# Patient Record
Sex: FEMALE | URBAN - METROPOLITAN AREA
[De-identification: names, ages, dates, MRNs, and addresses within clinical notes are randomized per-mention and may not be internally consistent; named-entity substitution may affect disease eponyms.]

---

## 2022-04-30 ENCOUNTER — TELEPHONE ENCOUNTER (OUTPATIENT)
Dept: URBAN - METROPOLITAN AREA CLINIC 121 | Facility: CLINIC | Age: 57
End: 2022-04-30

## 2022-05-01 ENCOUNTER — TELEPHONE ENCOUNTER (OUTPATIENT)
Dept: URBAN - METROPOLITAN AREA CLINIC 121 | Facility: CLINIC | Age: 57
End: 2022-05-01

## 2022-05-02 ENCOUNTER — WEB ENCOUNTER (OUTPATIENT)
Dept: URBAN - METROPOLITAN AREA CLINIC 27 | Facility: CLINIC | Age: 57
End: 2022-05-02

## 2022-05-02 ENCOUNTER — OFFICE VISIT (OUTPATIENT)
Dept: URBAN - METROPOLITAN AREA CLINIC 27 | Facility: CLINIC | Age: 57
End: 2022-05-02
Payer: COMMERCIAL

## 2022-05-02 ENCOUNTER — DASHBOARD ENCOUNTERS (OUTPATIENT)
Age: 57
End: 2022-05-02

## 2022-05-02 ENCOUNTER — LAB OUTSIDE AN ENCOUNTER (OUTPATIENT)
Dept: URBAN - METROPOLITAN AREA CLINIC 27 | Facility: CLINIC | Age: 57
End: 2022-05-02

## 2022-05-02 VITALS
DIASTOLIC BLOOD PRESSURE: 99 MMHG | BODY MASS INDEX: 24.75 KG/M2 | HEIGHT: 64 IN | WEIGHT: 145 LBS | SYSTOLIC BLOOD PRESSURE: 135 MMHG | HEART RATE: 89 BPM | TEMPERATURE: 96.9 F

## 2022-05-02 DIAGNOSIS — K59.00 CONSTIPATION, UNSPECIFIED CONSTIPATION TYPE: ICD-10-CM

## 2022-05-02 DIAGNOSIS — R93.3 ABNORMAL FINDINGS ON DIAGNOSTIC IMAGING OF OTHER PARTS OF DIGESTIVE TRACT: ICD-10-CM

## 2022-05-02 DIAGNOSIS — Z87.19 HX OF DIVERTICULITIS OF COLON: ICD-10-CM

## 2022-05-02 DIAGNOSIS — K21.9 GASTROESOPHAGEAL REFLUX DISEASE, UNSPECIFIED WHETHER ESOPHAGITIS PRESENT: ICD-10-CM

## 2022-05-02 PROCEDURE — 99204 OFFICE O/P NEW MOD 45 MIN: CPT | Performed by: INTERNAL MEDICINE

## 2022-05-02 NOTE — HPI-TODAY'S VISIT:
Patient here self-referred for evaluation of recent obstipation. She underwent knee replacement five weeks ago, and was given Percocet postoperatively. She soon thereafter began having severe constipation x1wk that did not respond to massaging the abdomen, prunes, laxatives or enemas, and she went to the emergency room for further evaluation. Labs there were normal, though CT AP showed mild pericolonic fat stranding with mild wall thickening of the descending colon (?colitis). She was given IVFs, and apparently had two large BMs while in the ER, with marked improvement in her symptoms. She was discharged home to complete a 2w course of augmentin. She states that her stools are now regular with adequate water intake and Colace. She is still taking Percocet, though she is trying to wean this. She has no other GI symptoms currently, aside from rare reflux symptoms for which she uses Tums. Her last colonoscopy was approximately two years ago; results unknown. She has a history of diverticulitis for which she underwent sigmoidectomy in 2014. She had been hospitalized with diverticulitis and microperforation at least twice prior to the surgery. She does not take a fiber supplement. There is no family history of colon cancer or polyps, though her father had cholangiocarcinoma. Patient here self-referred for evaluation of recent obstipation. She underwent knee replacement five weeks ago, and was given Percocet postoperatively. She soon thereafter began having severe constipation x1wk that did not respond to massaging the abdomen, prunes, laxatives or enemas, and she went to the emergency room for further evaluation. Labs there were normal, though CT AP showed mild pericolonic fat stranding with mild wall thickening of the descending colon (?colitis). She was given IVFs, and apparently had two large BMs while in the ER, with marked improvement in her symptoms. She was discharged home to complete a 2w course of augmentin. She states that her stools are now regular with adequate water intake and Colace. She is still taking Percocet, though she is trying to wean this. She has no other GI symptoms currently, aside from rare reflux symptoms for which she uses Tums. Her last colonoscopy was approximately two years ago; results unknown. She has a history of diverticulitis for which she underwent sigmoidectomy in 2014. She had been hospitalized with diverticulitis and microperforation at least twice prior to the surgery. She does not take a fiber supplement. There is no family history of colon cancer or polyps, though her father had cholangiocarcinoma.

## 2022-05-03 PROBLEM — 14760008: Status: ACTIVE | Noted: 2022-05-02

## 2022-05-03 PROBLEM — 118361000119100: Status: ACTIVE | Noted: 2022-05-02

## 2022-05-03 PROBLEM — 235595009: Status: ACTIVE | Noted: 2022-05-02

## 2022-05-03 PROBLEM — 59621000: Status: ACTIVE | Noted: 2022-05-02

## 2022-05-13 PROBLEM — 442182001: Status: ACTIVE | Noted: 2022-05-02

## 2022-05-26 ENCOUNTER — CLAIMS CREATED FROM THE CLAIM WINDOW (OUTPATIENT)
Dept: URBAN - METROPOLITAN AREA SURGERY CENTER 7 | Facility: SURGERY CENTER | Age: 57
End: 2022-05-26
Payer: COMMERCIAL

## 2022-05-26 DIAGNOSIS — R93.3 ABNORMAL FINDING ON GI TRACT IMAGING: ICD-10-CM

## 2022-05-26 DIAGNOSIS — Z98.0 H/O BILLROTH II OPERATION: ICD-10-CM

## 2022-05-26 PROCEDURE — 45378 DIAGNOSTIC COLONOSCOPY: CPT | Performed by: INTERNAL MEDICINE

## 2022-05-26 PROCEDURE — G8907 PT DOC NO EVENTS ON DISCHARG: HCPCS | Performed by: INTERNAL MEDICINE
